# Patient Record
(demographics unavailable — no encounter records)

---

## 2025-01-07 NOTE — PHYSICAL EXAM
[No Acute Distress] : no acute distress [EOMI] : extraocular movements intact [Normal Outer Ear/Nose] : the outer ears and nose were normal in appearance [No JVD] : no jugular venous distention [No Respiratory Distress] : no respiratory distress  [No Accessory Muscle Use] : no accessory muscle use [Clear to Auscultation] : lungs were clear to auscultation bilaterally [Normal Rate] : normal rate  [Regular Rhythm] : with a regular rhythm [Normal S1, S2] : normal S1 and S2 [No Edema] : there was no peripheral edema [Soft] : abdomen soft [Non-distended] : non-distended [Normal Bowel Sounds] : normal bowel sounds [No CVA Tenderness] : no CVA  tenderness [Grossly Normal Strength/Tone] : grossly normal strength/tone [No Rash] : no rash [Coordination Grossly Intact] : coordination grossly intact [No Focal Deficits] : no focal deficits [Normal Gait] : normal gait [Normal Affect] : the affect was normal [Normal Mood] : the mood was normal [Normal Insight/Judgement] : insight and judgment were intact [de-identified] : +ve PND post pharynx minimally dull TMs B [de-identified] : decreased clear BS at bases B/L

## 2025-01-07 NOTE — REVIEW OF SYSTEMS
[Negative] : Heme/Lymph [FreeTextEntry2] : ssee HPI  [FreeTextEntry4] : ssee HPI   [FreeTextEntry6] : ssee HPI

## 2025-01-07 NOTE — HISTORY OF PRESENT ILLNESS
[FreeTextEntry8] : Pt. c/o cough/ nasal pressure going on for 6 days.   44 yo female c 6 d hx of productive cough c green phlegm, +ve sick contact ( 2 yr old daughter in  (Negative for COVID-19 and/or Flu) no f/c/s no  N/V/D no abd pain +ve good appetite  no sore throat no ear pain B/L  cough worse at night

## 2025-01-07 NOTE — HISTORY OF PRESENT ILLNESS
[FreeTextEntry8] : Pt. c/o cough/ nasal pressure going on for 6 days.   46 yo female c 6 d hx of productive cough c green phlegm, +ve sick contact ( 2 yr old daughter in  (Negative for COVID-19 and/or Flu) no f/c/s no  N/V/D no abd pain +ve good appetite  no sore throat no ear pain B/L  cough worse at night

## 2025-01-07 NOTE — PHYSICAL EXAM
[No Acute Distress] : no acute distress [EOMI] : extraocular movements intact [Normal Outer Ear/Nose] : the outer ears and nose were normal in appearance [No JVD] : no jugular venous distention [No Respiratory Distress] : no respiratory distress  [No Accessory Muscle Use] : no accessory muscle use [Clear to Auscultation] : lungs were clear to auscultation bilaterally [Normal Rate] : normal rate  [Regular Rhythm] : with a regular rhythm [Normal S1, S2] : normal S1 and S2 [No Edema] : there was no peripheral edema [Soft] : abdomen soft [Non-distended] : non-distended [Normal Bowel Sounds] : normal bowel sounds [No CVA Tenderness] : no CVA  tenderness [Grossly Normal Strength/Tone] : grossly normal strength/tone [No Rash] : no rash [Coordination Grossly Intact] : coordination grossly intact [No Focal Deficits] : no focal deficits [Normal Gait] : normal gait [Normal Affect] : the affect was normal [Normal Mood] : the mood was normal [Normal Insight/Judgement] : insight and judgment were intact [de-identified] : +ve PND post pharynx minimally dull TMs B [de-identified] : decreased clear BS at bases B/L

## 2025-01-07 NOTE — PLAN
[FreeTextEntry1] : aggressive hydration!!!!!!!  OTC Flonase   2 sp ea nostril qd   see med orders   recommend WFH this week ending 1/10/25

## 2025-07-24 NOTE — ASSESSMENT
[Vaccines Reviewed] : Immunizations reviewed today. Please see immunization details in the vaccine log within the immunization flowsheet.  [FreeTextEntry1] : Annual physical: f/u routine labwork Dyspnea: no active sob, NAD, ctabl, no use of accessory muscles, O2 sat 98% on room air, f/u xray chest ordered, f/u labwork including CBC/CMP/TSH/BNP/D-dimer ordered, noncompliant with advair, suspect asthma, will start breo, refer to pulmonology Palpitations: EKG NSR, f/u labwork including CBC/CMP/TSH/a1c/mag/phos ordered, refer to cardiology Abnormal celiac panel: f/u GI Colitis: f/u GI Asthma: c/w albuterol hfa prn for sob/wheezing, start breo as prescribed, advised to go to ER if condition is not resolved with hfa GERD: resolved HTN: bp reviewed, refill hydrochlorothiazide 12.5 mg po daily, recommend low salt diet, wt loss, exercise, DASH diet Left breast lump: no suspicious findings on US, f/u GYN Myoma: f/u GYN BMI 29: Recommend low fat diet, wt loss, exercise, and DASH diet. RTC 1 wk

## 2025-07-24 NOTE — HEALTH RISK ASSESSMENT
[Patient reported colonoscopy was normal] : Patient reported colonoscopy was normal [Fair] :  ~his/her~ mood as fair [Yes] : Yes [Monthly or less (1 pt)] : Monthly or less (1 point) [1 or 2 (0 pts)] : 1 or 2 (0 points) [Never (0 pts)] : Never (0 points) [No] : In the past 12 months have you used drugs other than those required for medical reasons? No [0] : 2) Feeling down, depressed, or hopeless: Not at all (0) [PHQ-2 Negative - No further assessment needed] : PHQ-2 Negative - No further assessment needed [Never] : Never [Patient reported mammogram was normal] : Patient reported mammogram was normal [Patient reported PAP Smear was normal] : Patient reported PAP Smear was normal [HIV test declined] : HIV test declined [Hepatitis C test declined] : Hepatitis C test declined [With Significant Other] : lives with significant other [Employed] : employed [Significant Other] : lives with significant other [Sexually Active] : sexually active [Feels Safe at Home] : Feels safe at home [Fully functional (bathing, dressing, toileting, transferring, walking, feeding)] : Fully functional (bathing, dressing, toileting, transferring, walking, feeding) [Fully functional (using the telephone, shopping, preparing meals, housekeeping, doing laundry, using] : Fully functional and needs no help or supervision to perform IADLs (using the telephone, shopping, preparing meals, housekeeping, doing laundry, using transportation, managing medications and managing finances) [Audit-CScore] : 1 [de-identified] : needs improvement [de-identified] : needs improvement [[id number]] : 0 [High Risk Behavior] : no high risk behavior [Reports changes in hearing] : Reports no changes in hearing [Reports changes in vision] : Reports no changes in vision [Reports changes in dental health] : Reports no changes in dental health [MammogramDate] : 12/24 [PapSmearDate] : 01/23 [ColonoscopyDate] : 01/24 [ColonoscopyComments] : Poor prep, Repeat in 1 year

## 2025-07-24 NOTE — REVIEW OF SYSTEMS
[Palpitations] : palpitations [Shortness Of Breath] : shortness of breath [Wheezing] : wheezing [Negative] : Psychiatric [Chest Pain] : no chest pain [Lower Ext Edema] : no lower extremity edema [Orthopnea] : no orthopnea [Paroxysmal Nocturnal Dyspnea] : no paroxysmal nocturnal dyspnea [Cough] : no cough [Dyspnea on Exertion] : no dyspnea on exertion

## 2025-07-24 NOTE — HISTORY OF PRESENT ILLNESS
[FreeTextEntry1] :  VICK is a 46 year-old female here for a CPE [de-identified] : 47 yo female presents for annual physical. reports concern for dyspnea with exertion and palpitations. She notes that when she exerts herself she gets sob and sometimes palpitations. she does have a history of asthma and notes that dyspea improves with albuterol hfa. It is occurring every couple of days. Symptoms have occurred at rest but generally on exertion. No active sob or palpitations. Denies fever, chills, cp, nvcd.